# Patient Record
Sex: FEMALE | Race: WHITE | NOT HISPANIC OR LATINO | Employment: OTHER | URBAN - METROPOLITAN AREA
[De-identification: names, ages, dates, MRNs, and addresses within clinical notes are randomized per-mention and may not be internally consistent; named-entity substitution may affect disease eponyms.]

---

## 2023-12-27 ENCOUNTER — MEDICAL CORRESPONDENCE (OUTPATIENT)
Dept: SCHEDULING | Facility: CLINIC | Age: 68
End: 2023-12-27
Payer: MEDICARE

## 2024-01-15 ENCOUNTER — HOSPITAL ENCOUNTER (OUTPATIENT)
Dept: CARDIOLOGY | Facility: CLINIC | Age: 69
Discharge: HOME | End: 2024-01-15
Attending: INTERNAL MEDICINE | Admitting: INTERNAL MEDICINE
Payer: MEDICARE

## 2024-01-15 DIAGNOSIS — I25.10 CORONARY ATHEROSCLEROSIS OF NATIVE CORONARY ARTERY: ICD-10-CM

## 2024-01-15 DIAGNOSIS — R06.09 DYSPNEA ON EXERTION: ICD-10-CM

## 2024-01-15 PROCEDURE — 93350 STRESS TTE ONLY: CPT | Mod: 26 | Performed by: INTERNAL MEDICINE

## 2024-01-15 PROCEDURE — 93016 CV STRESS TEST SUPVJ ONLY: CPT | Performed by: INTERNAL MEDICINE

## 2024-01-15 PROCEDURE — 93325 DOPPLER ECHO COLOR FLOW MAPG: CPT | Mod: TC

## 2024-01-15 PROCEDURE — 93325 DOPPLER ECHO COLOR FLOW MAPG: CPT | Mod: 26 | Performed by: INTERNAL MEDICINE

## 2024-01-15 PROCEDURE — 93018 CV STRESS TEST I&R ONLY: CPT | Performed by: INTERNAL MEDICINE

## 2024-01-15 PROCEDURE — 93321 DOPPLER ECHO F-UP/LMTD STD: CPT | Mod: 26 | Performed by: INTERNAL MEDICINE

## 2024-01-15 RX ORDER — PANTOPRAZOLE SODIUM 40 MG/1
40 TABLET, DELAYED RELEASE ORAL DAILY
COMMUNITY

## 2024-01-15 RX ORDER — ROSUVASTATIN CALCIUM 5 MG/1
5 TABLET, COATED ORAL WEEKLY
COMMUNITY

## 2024-01-15 RX ORDER — AMLODIPINE BESYLATE 10 MG/1
10 TABLET ORAL DAILY
COMMUNITY

## 2024-01-15 RX ORDER — ASPIRIN 81 MG/1
81 TABLET ORAL DAILY
COMMUNITY

## 2024-01-15 RX ORDER — IRBESARTAN 300 MG/1
300 TABLET ORAL AT BEDTIME
COMMUNITY

## 2024-01-15 RX ORDER — DAPAGLIFLOZIN 10 MG/1
10 TABLET, FILM COATED ORAL DAILY
COMMUNITY

## 2024-01-25 ENCOUNTER — ANCILLARY PROCEDURE (OUTPATIENT)
Dept: BONE DENSITY | Facility: CLINIC | Age: 69
End: 2024-01-25
Attending: INTERNAL MEDICINE
Payer: MEDICARE

## 2024-01-25 DIAGNOSIS — E28.310 SYMPTOMATIC PREMATURE MENOPAUSE: ICD-10-CM

## 2024-01-25 PROCEDURE — 77080 DXA BONE DENSITY AXIAL: CPT | Mod: TC | Performed by: PHYSICIAN ASSISTANT

## 2024-03-09 ENCOUNTER — HEALTH MAINTENANCE LETTER (OUTPATIENT)
Age: 69
End: 2024-03-09

## 2024-06-17 ENCOUNTER — TRANSFERRED RECORDS (OUTPATIENT)
Dept: HEALTH INFORMATION MANAGEMENT | Facility: CLINIC | Age: 69
End: 2024-06-17

## 2024-06-17 LAB
ALT SERPL-CCNC: 10 U/L (ref 6–29)
AST SERPL-CCNC: 10 U/L (ref 10–35)
CREATININE (EXTERNAL): 1.3 MG/DL (ref 0.5–1.05)
GFR ESTIMATED (EXTERNAL): 45 ML/MIN/1.73M2
GLUCOSE (EXTERNAL): 83 MG/DL (ref 65–99)
HBA1C MFR BLD: 6.2 %
POTASSIUM (EXTERNAL): 4.6 MMOL/L (ref 3.5–5.3)

## 2024-07-05 ENCOUNTER — TRANSCRIBE ORDERS (OUTPATIENT)
Dept: OTHER | Age: 69
End: 2024-07-05

## 2024-07-05 DIAGNOSIS — M19.90 INFLAMMATORY ARTHROPATHY: Primary | ICD-10-CM

## 2024-08-06 ENCOUNTER — TELEPHONE (OUTPATIENT)
Dept: RHEUMATOLOGY | Facility: CLINIC | Age: 69
End: 2024-08-06
Payer: MEDICARE

## 2024-08-06 NOTE — TELEPHONE ENCOUNTER
[facility] Call [facility]    Phone Message    May a detailed message be left on voicemail: yes     Reason for Call: Other: Dr. Fran Prieto' office calling to request a return call to [facility] care.      Action Taken: Message routed to:  [facility] ([facility] rheum    Travel Screening: Not Applicable     Date of Service:

## 2024-08-08 NOTE — TELEPHONE ENCOUNTER
Please call patient and schedule for a new appointment per Dr. Singh. I have blocked Sept. 11th  30 minutes MELVIN 12-12:30 per Dr. Singh.     Thanks

## 2024-09-11 ENCOUNTER — HOSPITAL ENCOUNTER (OUTPATIENT)
Dept: GENERAL RADIOLOGY | Facility: HOSPITAL | Age: 69
Discharge: HOME | End: 2024-09-11
Attending: INTERNAL MEDICINE | Admitting: INTERNAL MEDICINE
Payer: MEDICARE

## 2024-09-11 ENCOUNTER — LAB (OUTPATIENT)
Dept: LAB | Facility: CLINIC | Age: 69
End: 2024-09-11
Payer: MEDICARE

## 2024-09-11 ENCOUNTER — OFFICE VISIT (OUTPATIENT)
Dept: RHEUMATOLOGY | Facility: CLINIC | Age: 69
End: 2024-09-11
Attending: INTERNAL MEDICINE
Payer: MEDICARE

## 2024-09-11 VITALS
HEART RATE: 73 BPM | WEIGHT: 158.3 LBS | OXYGEN SATURATION: 98 % | SYSTOLIC BLOOD PRESSURE: 121 MMHG | DIASTOLIC BLOOD PRESSURE: 72 MMHG

## 2024-09-11 DIAGNOSIS — M15.0 PRIMARY OSTEOARTHRITIS INVOLVING MULTIPLE JOINTS: ICD-10-CM

## 2024-09-11 DIAGNOSIS — R76.8 ANA POSITIVE: ICD-10-CM

## 2024-09-11 DIAGNOSIS — M25.50 POLYARTHRALGIA: Primary | ICD-10-CM

## 2024-09-11 DIAGNOSIS — M25.50 POLYARTHRALGIA: ICD-10-CM

## 2024-09-11 LAB
ALBUMIN SERPL BCG-MCNC: 4.6 G/DL (ref 3.5–5.2)
ALT SERPL W P-5'-P-CCNC: 15 U/L (ref 0–50)
CREAT SERPL-MCNC: 1.33 MG/DL (ref 0.51–0.95)
CRP SERPL-MCNC: 9.46 MG/L
EGFRCR SERPLBLD CKD-EPI 2021: 43 ML/MIN/1.73M2
ERYTHROCYTE [DISTWIDTH] IN BLOOD BY AUTOMATED COUNT: 13.4 % (ref 10–15)
ERYTHROCYTE [SEDIMENTATION RATE] IN BLOOD BY WESTERGREN METHOD: 19 MM/HR (ref 0–30)
HCT VFR BLD AUTO: 38.8 % (ref 35–47)
HGB BLD-MCNC: 12.1 G/DL (ref 11.7–15.7)
MCH RBC QN AUTO: 26.9 PG (ref 26.5–33)
MCHC RBC AUTO-ENTMCNC: 31.2 G/DL (ref 31.5–36.5)
MCV RBC AUTO: 86 FL (ref 78–100)
PLATELET # BLD AUTO: 354 10E3/UL (ref 150–450)
RBC # BLD AUTO: 4.49 10E6/UL (ref 3.8–5.2)
WBC # BLD AUTO: 9.3 10E3/UL (ref 4–11)

## 2024-09-11 PROCEDURE — 36415 COLL VENOUS BLD VENIPUNCTURE: CPT

## 2024-09-11 PROCEDURE — 86160 COMPLEMENT ANTIGEN: CPT

## 2024-09-11 PROCEDURE — 73030 X-RAY EXAM OF SHOULDER: CPT | Mod: 50

## 2024-09-11 PROCEDURE — 85652 RBC SED RATE AUTOMATED: CPT

## 2024-09-11 PROCEDURE — 73130 X-RAY EXAM OF HAND: CPT | Mod: 50

## 2024-09-11 PROCEDURE — 86140 C-REACTIVE PROTEIN: CPT

## 2024-09-11 PROCEDURE — 99204 OFFICE O/P NEW MOD 45 MIN: CPT | Performed by: INTERNAL MEDICINE

## 2024-09-11 PROCEDURE — G2211 COMPLEX E/M VISIT ADD ON: HCPCS | Performed by: INTERNAL MEDICINE

## 2024-09-11 PROCEDURE — 82565 ASSAY OF CREATININE: CPT

## 2024-09-11 PROCEDURE — 82040 ASSAY OF SERUM ALBUMIN: CPT

## 2024-09-11 PROCEDURE — 85027 COMPLETE CBC AUTOMATED: CPT

## 2024-09-11 PROCEDURE — 84460 ALANINE AMINO (ALT) (SGPT): CPT

## 2024-09-11 PROCEDURE — 86235 NUCLEAR ANTIGEN ANTIBODY: CPT

## 2024-09-11 RX ORDER — TIRZEPATIDE 2.5 MG/.5ML
INJECTION, SOLUTION SUBCUTANEOUS
COMMUNITY
Start: 2024-06-18

## 2024-09-11 RX ORDER — DULOXETIN HYDROCHLORIDE 20 MG/1
20 CAPSULE, DELAYED RELEASE ORAL DAILY
Qty: 30 CAPSULE | Refills: 2 | Status: SHIPPED | OUTPATIENT
Start: 2024-09-11 | End: 2024-09-17

## 2024-09-11 RX ORDER — TIRZEPATIDE 10 MG/.5ML
INJECTION, SOLUTION SUBCUTANEOUS
COMMUNITY
Start: 2024-09-08

## 2024-09-11 RX ORDER — TIRZEPATIDE 5 MG/.5ML
INJECTION, SOLUTION SUBCUTANEOUS
COMMUNITY
Start: 2024-06-18

## 2024-09-11 RX ORDER — TIRZEPATIDE 7.5 MG/.5ML
7.5 INJECTION, SOLUTION SUBCUTANEOUS WEEKLY
COMMUNITY
Start: 2024-08-13

## 2024-09-11 NOTE — PROGRESS NOTES
"  This document was created using a software with less than 100% fidelity, at times resulting in unintended, even erroneous syntax and grammar.  The reader is advised to keep this under consideration while reviewing, interpreting this note.      Rheumatology Consult Note      Brii Maynard     YOB: 1955 Age: 69 year old    Date of visit: 9/11/24    PCP: Fran Nogueira    Chief Complaint   Patient presents with:  Consult      Assessment and Plan     Brii was seen today for consult.    Diagnoses and all orders for this visit:    Polyarthralgia  -     XR Hand Bilateral G/E 3 Views; Future  -     XR Shoulder Bilateral 3 Views; Future  -     Erythrocyte sedimentation rate auto; Future  -     CRP inflammation; Future  -     CANDI antibody panel; Future  -     Complement C3; Future  -     Complement C4; Future  -     Albumin level; Future  -     ALT; Future  -     Creatinine; Future  -     CBC with Platelets; Future  -     DULoxetine (CYMBALTA) 20 MG capsule; Take 1 capsule (20 mg) by mouth daily.    Primary osteoarthritis involving multiple joints  -     DULoxetine (CYMBALTA) 20 MG capsule; Take 1 capsule (20 mg) by mouth daily.    QUYEN positive    Other orders  -     Adult Rheumatology  Referral           This patient with polyarthralgias has osteoarthritis.  This is discussed.  Literature on this is provided.  She is already on acetaminophen sustained-release.  She is status post bilateral TKAs.  One of the options for her to consider would be duloxetine literature on this is provided pros and cons discussed.  One of the key concerns in her context is if she has any additional component such as an inflammatory joint disease.  Her QUYEN is noted to be \"positive\" and negative dsDNA negative rheumatoid factor and negative anti-CCP antibody modest elevation of sed rate and CRP back in June.  This is outlined.  Further workup and evaluation are indicated x-rays to be done as noted, further labs " "and further interrogation of QUYEN as noted.  At 1 point in the past she has been told that she may have fibromyalgia she does have some signals that would require further evaluation once the current workup and her response to duloxetine becomes apparent.  X-rays of the shoulders and hands done today.  Personally reviewed the films.  In the hand x-rays she has advanced degenerative changes predominantly in the distal interphalangeal joints of both hands.  There is no chondrocalcinosis.  In the shoulders she has calcific deposits just above the humerus in conjunction with distribution of rotator cuff consistent with calcific tendinitis.  She has beginning of degenerative changes with acetabular osteophyte, caudally.  She plans to go out of state and of October early November as this year she is considering to be a \"snowbird\" back in March -2025.  The longitudinal plan of care for the diagnosis(es)/condition(s) as documented were addressed during this visit. Due to the added complexity in care, I will continue to support Brii in the subsequent management and with ongoing continuity of care.      HPI   Brii Maynard is a 69 year old female  is seen today for evaluation of polyarthralgias.  She reports that her joints have been bothersome going back several years.  She noted that there is hardly any joint.  That does not trouble her but the worst of her symptoms are in her hands, feet, knees.  She has had arthroplasty on both sides 2 than 3 years ago.  She was seen in orthopedics recently to revisit ongoing knee pain she reports having had x-rays again and was told that the hardware is in good place.  She noted that her symptoms tend to be worse with activity the more she is able to use her joints the worst she gets.  On most days the worst part of her joint pains are toward the evening after the dinner when she finds herself taking Tylenol fairly regularly.  She has difficult time holding onto things, opening jars " "squeezing.  She points to the DIPs and the PIPs as a source of her symptoms.  She wakes up in the morning unrefreshed having had 6 hours or so of sleep although woken up because of having to go to the bathroom.  Most days her pain level is around 3 out of 10 with activity the pain can rise.  She also describes painful shoulders.  This is longstanding.  There is no history of recent trauma or fall.  Sometimes this is woke her up from sleep laying on 1 side or the other.  Sometimes reaching is hard.  She has not had features suggestive of dactylitis.  Her younger sister who has Down syndrome is noted to have psoriasis as well as thyroid disease another sister might also have thyroid disease though she is not sure.  She had her workup done at her primary physician's office her sed rate and CRP were modestly elevated her QUYEN came back \"positive\", dsDNA were negative.  Rheumatoid factor anti-CCP antibody were negative.  She was asked to go ahead and start taking Tylenol on a regular basis that she had been avoiding to take on a regular basis for her concerns around kidney disease..  She reports no features suggestive of stomatitis, pleuritic symptoms, Raynaud's.  She has not had DVT PE.  There is no history of seizure disorder.  She has never been pregnant.  She had hysterectomy at age 38.  She reports no features suggestive of iritis.           Active Problem List   There is no problem list on file for this patient.    Past Medical History   No past medical history on file.  Past Surgical History   No past surgical history on file.  Allergy     Allergies   Allergen Reactions    Ampicillin     Sulfa Antibiotics      Current Medication List     Current Outpatient Medications   Medication Sig Dispense Refill    amLODIPine (NORVASC) 10 MG tablet Take 10 mg by mouth daily      aspirin 81 MG EC tablet Take 81 mg by mouth daily      dapagliflozin (FARXIGA) 10 MG TABS tablet Take 10 mg by mouth daily      ESTRADIOL None Entered "      FLONASE INHA 50 MCG/DOSE NA INHALE 2 SPRAYS IN EACH NOSTRIL ONCE DAILY 1 bottle 1    IBUPROFEN 800 MG OR TABS 1 TABLET 3 TIMES DAILY      irbesartan (AVAPRO) 300 MG tablet Take 300 mg by mouth at bedtime      pantoprazole (PROTONIX) 40 MG EC tablet Take 40 mg by mouth daily      ROBITUSSIN A-C  MG/5ML OR SYRP ONE TO TWO TEASPOONS EVERY 4 HOURS, AS NEEDED FOR COUGH 4 OZ 0    rosuvastatin (CRESTOR) 5 MG tablet Take 5 mg by mouth once a week Twice weekly      Semaglutide (OZEMPIC, 2 MG/DOSE, SC) Inject 2 mg Subcutaneous once a week      TESSALON PERLES 100 MG OR CAPS 2 CAPSULES 3 TIMES DAILY AS NEEDED 42 3     No current facility-administered medications for this visit.       No current facility-administered medications for this visit.        Family History   No family history on file.      Physical Exam     COMPREHENSIVE EXAMINATION:  Vitals:    09/11/24 1150   BP: 121/72   BP Location: Left arm   Pulse: 73   SpO2: 98%   Weight: 71.8 kg (158 lb 4.8 oz)     A well appearing alert oriented female. Vital data as noted above. Her eyes examined for inflammation/scleromalacia. ENT examined for oral mucositis, moisture, thrush, nasal deformity, external ear redness, deformity. Her neck is examined for suppleness and lymphadenopathy.  Cardiopulmonary examination without dyspnea at rest, use of accessory muscles of breathing, wheezing, edema, peripheral or central cyanosis.  Abdomen examined for softness, tenderness, obvious organomegaly.  Skin examined for heliotrope, malar area eruption, lupus pernio, periungual erythema, sclerodactyly, papules, erythema nodosum, purpura, nail pitting, onycholysis, and obvious psoriasis lesion. Neurological examination done for alertness, speech, facial symmetry,  tone and power in upper and lower extremities, and gait. The joint examination is performed for swelling, tenderness, warmth, erythema, and range of motion in the following joints: DIPs, PIPs, MCPs, wrists, first CMC's,  "elbows, shoulders, hips, knees, ankles, feet; spine for range of motion and paraspinal muscles for tenderness. The salient normal / abnormal findings are appended.  She has marked Heberden's and occasional Melissa's, tenderness throughout these PIPs and DIP joints.  She has marked Heberden's and occasional Melissa's, tenderness throughout these PIPs and DIP joints.  IP joints of the thumbs tender.  She has scattered tenderness in the metacarpophalangeal joints.She has impingement in both her shoulders tenderness in the trochanteric areas TKA bilaterally.  She does not have tenderness in the metatarsophalangeal joints.  There is no sclerodactyly periungual erythema there is no Maller area eruption she does not have stomatitis.  She is however tender across trapezius along the paraspinal region proximal upper extremities she is tender in the proximal and distal lower extremity soft tissues.  There is no dactylitis of digits or dactylitis of the toes.  She does not have ulceration fingertips or pitting.    Labs / Imaging (select studies)     No results found for: \"PPDINDURATIO\", \"PPDREDNESS\", \"TBGOLT\", \"RHF\", \"CCPABG\", \"URIC\", \"MD34505\", \"ME675167\", \"ANTIDNA\", \"ANTIDNAINT\", \"CARIA\", \"TF65824\", \"XW127820\", \"ENASM\", \"ENASCL\", \"JO1\", \"ENASSA\", \"ENASSB\", \"CENTA\", \"M9AXBLO\", \"Z7QNPXA\", \"KG2799\"   No results found for: \"CCPT\", \"RF8\", \"HGB\", \"BUN\", \"CREAT\", \"SED\", \"CRP\", \"AST\", \"CARIA\", \"ANCA\", \"QS8257\", \"ALBUMIN\", \"ALKPHOS\", \"ALT\", \"UL63399603\", \"CC90412622\", \"[id number]\", \"RHF\"       Immunization History     Immunization History   Administered Date(s) Administered    COVID-19 Bivalent 18+ (Moderna) 10/04/2022    COVID-19 Monovalent 12+ (Pfizer 2022) 04/26/2022    COVID-19 Monovalent 18+ (Moderna) 02/26/2021, 03/23/2021, 10/28/2021              "

## 2024-09-11 NOTE — PROGRESS NOTES
Rheumatology follow-up visit note     Brii is a 69 year old female presents today for follow-up.    Diagnoses and all orders for this visit:    Inflammatory arthropathy  -     Adult Rheumatology  Referral        ***    {:[id number]}    HPI    Brii Maynard is a 69 year old female is here for follow-up of         DETAILED EXAMINATION  09/11/24  :    There were no vitals filed for this visit.  Alert oriented. Head including the face is examined for malar rash, heliotropes, scarring, lupus pernio. Eyes examined for redness such as in episcleritis/scleritis, periorbital lesions.   Neck/ Face examined for parotid gland swelling, range of motion of neck.  Left upper and lower and right upper and lower extremities examined for tenderness, swelling, warmth of the appendicular joints, range of motion, edema, rash.  Some of the important findings included: she does not have evidence of synovitis in the palpable joints of the upper extremities.  No significant deformities of the digits.  *** Heberden nodes.  Range of motion of the shoulders *** show full abduction.  No JLT effusion or warmth of the knees.  she does not have dactylitis of the digits.     There are no problems to display for this patient.    No past surgical history on file.   No past medical history on file.  Allergies   Allergen Reactions    Ampicillin     Sulfa Antibiotics      Current Outpatient Medications   Medication Sig Dispense Refill    amLODIPine (NORVASC) 10 MG tablet Take 10 mg by mouth daily      aspirin 81 MG EC tablet Take 81 mg by mouth daily      dapagliflozin (FARXIGA) 10 MG TABS tablet Take 10 mg by mouth daily      ESTRADIOL None Entered      FLONASE INHA 50 MCG/DOSE NA INHALE 2 SPRAYS IN EACH NOSTRIL ONCE DAILY 1 bottle 1    IBUPROFEN 800 MG OR TABS 1 TABLET 3 TIMES DAILY      irbesartan (AVAPRO) 300 MG tablet Take 300 mg by mouth at bedtime      pantoprazole (PROTONIX) 40 MG EC tablet Take 40 mg by mouth daily       "ROBITUSSIN A-C  MG/5ML OR SYRP ONE TO TWO TEASPOONS EVERY 4 HOURS, AS NEEDED FOR COUGH 4 OZ 0    rosuvastatin (CRESTOR) 5 MG tablet Take 5 mg by mouth once a week Twice weekly      Semaglutide (OZEMPIC, 2 MG/DOSE, SC) Inject 2 mg Subcutaneous once a week      TESSALON PERLES 100 MG OR CAPS 2 CAPSULES 3 TIMES DAILY AS NEEDED 42 3     family history is not on file.  Social Connections: Not on file          No results found for: \"WBC\", \"RBC\", \"HGB\", \"HCT\", \"MCV\", \"MCH\", \"PLT\", \"LYMPH\", \"AST\", \"ALT\", \"ALBUMIN\", \"ALKPHOS\", \"CR\", \"GFRESTIMATED\", \"GFRESTBLACK\", \"CRPOC\", \"CCR\", \"UCRR\", \"SED\", \"CRP\", \"NTBNPI\"            "

## 2024-09-12 LAB
C3 SERPL-MCNC: 154 MG/DL (ref 81–157)
C4 SERPL-MCNC: 34 MG/DL (ref 13–39)
ENA SM IGG SER IA-ACNC: <0.7 U/ML
ENA SM IGG SER IA-ACNC: NEGATIVE
ENA SS-A AB SER IA-ACNC: <0.5 U/ML
ENA SS-A AB SER IA-ACNC: NEGATIVE
ENA SS-B IGG SER IA-ACNC: <0.6 U/ML
ENA SS-B IGG SER IA-ACNC: NEGATIVE
U1 SNRNP IGG SER IA-ACNC: <1.1 U/ML
U1 SNRNP IGG SER IA-ACNC: NEGATIVE

## 2024-09-17 ENCOUNTER — TELEPHONE (OUTPATIENT)
Dept: RHEUMATOLOGY | Facility: CLINIC | Age: 69
End: 2024-09-17
Payer: MEDICARE

## 2024-09-17 NOTE — TELEPHONE ENCOUNTER
[facility] Call [facility]    Phone Message    May a detailed message be left on voicemail: yes     Reason for Call: Other: Pt states she is not taking the  DULoxetine (CYMBALTA) 20 MG capsule. Pt was dizzy and nauseated after taking medication.     Action Taken: Message routed to:  Other: [facility]    Travel Screening: Not Applicable     Date of Service: 9/17

## 2024-10-29 ENCOUNTER — OFFICE VISIT (OUTPATIENT)
Dept: RHEUMATOLOGY | Facility: CLINIC | Age: 69
End: 2024-10-29
Payer: COMMERCIAL

## 2024-10-29 VITALS — SYSTOLIC BLOOD PRESSURE: 118 MMHG | HEART RATE: 76 BPM | DIASTOLIC BLOOD PRESSURE: 64 MMHG | WEIGHT: 153 LBS

## 2024-10-29 DIAGNOSIS — R76.8 ANA POSITIVE: ICD-10-CM

## 2024-10-29 DIAGNOSIS — M77.8 TENDINITIS OF SHOULDER, UNSPECIFIED LATERALITY: ICD-10-CM

## 2024-10-29 DIAGNOSIS — M25.50 POLYARTHRALGIA: Primary | ICD-10-CM

## 2024-10-29 DIAGNOSIS — M15.0 PRIMARY OSTEOARTHRITIS INVOLVING MULTIPLE JOINTS: ICD-10-CM

## 2024-10-29 PROCEDURE — G2211 COMPLEX E/M VISIT ADD ON: HCPCS | Performed by: INTERNAL MEDICINE

## 2024-10-29 PROCEDURE — 99214 OFFICE O/P EST MOD 30 MIN: CPT | Performed by: INTERNAL MEDICINE

## 2024-10-29 RX ORDER — DAPAGLIFLOZIN 10 MG/1
10 TABLET, FILM COATED ORAL DAILY
COMMUNITY

## 2024-10-29 RX ORDER — TIRZEPATIDE 12.5 MG/.5ML
12.5 INJECTION, SOLUTION SUBCUTANEOUS
COMMUNITY
Start: 2024-10-02

## 2024-10-29 RX ORDER — CEPHALEXIN 500 MG/1
CAPSULE ORAL
COMMUNITY
Start: 2024-06-13

## 2024-10-29 NOTE — PROGRESS NOTES
"      Rheumatology follow-up visit note     Brii is a 69 year old female presents today for follow-up.    Brii was seen today for follow up.    Diagnoses and all orders for this visit:    Polyarthralgia    Tendinitis of shoulder, unspecified laterality    Primary osteoarthritis involving multiple joints    QUYEN positive        this patient has polyarthralgia secondary to osteoarthritis, shoulder tendinopathy, calcific.  She was not able to tolerate duloxetine even at 20 mg because of side effects.  There is little evidence to suggest that she has active connective tissue disease which is reassuring as discussed. The worst of her symptoms are in the hands, neck.  The option of is spine clinic evaluation as outlined.  Another option for her might be to consider pain clinic per her primary physician.  We discussed nonpharmacologic measures for OA management.  Given she is leaving for [place] in the next few days I would like to defer the C-spine consultation.  She would like to come back here in 6 months.The longitudinal plan of care for the diagnosis(es)/condition(s) as documented were addressed during this visit. Due to the added complexity in care, I will continue to support Brii in the subsequent management and with ongoing continuity of care.      Follow up in 6 months.    HPI    Brii Maynard is a 69 year old female is here for follow-up of   Polyarthralgias, she  Has osteoarthritis.  This is discussed.  Literature on this is provided.  She is already on acetaminophen sustained-release.  She is status post bilateral TKAs.  One of the options for her to consider would be duloxetine literature on this is provided pros and cons discussed.  One of the key concerns in her context is if she has any additional component such as an inflammatory joint disease.  Her QUYEN is noted to be \"positive\" and negative dsDNA negative rheumatoid factor and negative anti-CCP antibody modest elevation of sed rate and CRP " "back in June.  This is outlined.  Further workup and evaluation are indicated x-rays to be done as noted, further labs and further interrogation of QUYEN as noted.  At 1 point in the past she has been told that she may have fibromyalgia she does have some signals that would require further evaluation once the current workup and her response to duloxetine becomes apparent.  X-rays of the shoulders and hands done today.  Personally reviewed the films.  In the hand x-rays she has advanced degenerative changes predominantly in the distal interphalangeal joints of both hands.  There is no chondrocalcinosis.  In the shoulders she has calcific deposits just above the humerus in conjunction with distribution of rotator cuff consistent with calcific tendinitis.  She has beginning of degenerative changes with acetabular osteophyte, caudally.  She plans to go out of state and of October early November as this year she is considering to be a \"snowbird\" back in March -2025.    DETAILED EXAMINATION  10/29/24  :    Vitals:    10/29/24 1207   BP: 118/64   BP Location: Right arm   Patient Position: Sitting   Cuff Size: Adult Regular   Pulse: 76   Weight: 69.4 kg (153 lb)     Alert oriented. Head including the face is examined for malar rash, heliotropes, scarring, lupus pernio. Eyes examined for redness such as in episcleritis/scleritis, periorbital lesions.   Neck/ Face examined for parotid gland swelling, range of motion of neck.  Left upper and lower and right upper and lower extremities examined for tenderness, swelling, warmth of the appendicular joints, range of motion, edema, rash.  Some of the important findings included: she does not have evidence of synovitis in the palpable joints of the upper extremities.  No significant deformities of the digits.  She has Heberden's, Melissa's bilaterally.  She has minimal impingement of the shoulders.  There is no sclerodactyly no rash on her face.  Stomatitis.     There are no active " problems to display for this patient.    No past surgical history on file.   No past medical history on file.  Allergies   Allergen Reactions    Ampicillin     Duloxetine Dizziness    Sulfa Antibiotics      Current Outpatient Medications   Medication Sig Dispense Refill    amLODIPine (NORVASC) 10 MG tablet Take 10 mg by mouth daily      aspirin 81 MG EC tablet Take 81 mg by mouth daily      dapagliflozin (FARXIGA) 10 MG TABS tablet Take 10 mg by mouth daily      irbesartan (AVAPRO) 300 MG tablet Take 300 mg by mouth at bedtime      MOUNJARO 10 MG/0.5ML pen  (Patient not taking: Reported on 9/11/2024)      MOUNJARO 2.5 MG/0.5ML pen  (Patient not taking: Reported on 9/11/2024)      MOUNJARO 5 MG/0.5ML pen INJECT 1 SYRINGE SUBCUTANEOUSLY ONCE A WEEK AFTER 4 DOSES OF 2.5 MG (Patient not taking: Reported on 9/11/2024)      MOUNJARO 7.5 MG/0.5ML pen Inject 7.5 mg subcutaneously once a week. (Patient not taking: Reported on 9/11/2024)      pantoprazole (PROTONIX) 40 MG EC tablet Take 40 mg by mouth daily      ROBITUSSIN A-C  MG/5ML OR SYRP ONE TO TWO TEASPOONS EVERY 4 HOURS, AS NEEDED FOR COUGH 4 OZ 0    rosuvastatin (CRESTOR) 5 MG tablet Take 5 mg by mouth once a week Twice weekly       family history is not on file.  Social Connections: Not on file          WBC Count   Date Value Ref Range Status   09/11/2024 9.3 4.0 - 11.0 10e3/uL Final     RBC Count   Date Value Ref Range Status   09/11/2024 4.49 3.80 - 5.20 10e6/uL Final     Hemoglobin   Date Value Ref Range Status   09/11/2024 12.1 11.7 - 15.7 g/dL Final     Hematocrit   Date Value Ref Range Status   09/11/2024 38.8 35.0 - 47.0 % Final     MCV   Date Value Ref Range Status   09/11/2024 86 78 - 100 fL Final     MCH   Date Value Ref Range Status   09/11/2024 26.9 26.5 - 33.0 pg Final     Platelet Count   Date Value Ref Range Status   09/11/2024 354 150 - 450 10e3/uL Final     ALT   Date Value Ref Range Status   09/11/2024 15 0 - 50 U/L Final     Albumin   Date  Value Ref Range Status   09/11/2024 4.6 3.5 - 5.2 g/dL Final     Creatinine   Date Value Ref Range Status   09/11/2024 1.33 (H) 0.51 - 0.95 mg/dL Final     GFR Estimate   Date Value Ref Range Status   09/11/2024 43 (L) >60 mL/min/1.73m2 Final     Comment:     eGFR calculated using 2021 CKD-EPI equation.     Erythrocyte Sedimentation Rate   Date Value Ref Range Status   09/11/2024 19 0 - 30 mm/hr Final

## 2025-01-07 ENCOUNTER — TRANSFERRED RECORDS (OUTPATIENT)
Dept: HEALTH INFORMATION MANAGEMENT | Facility: CLINIC | Age: 70
End: 2025-01-07

## 2025-01-30 ENCOUNTER — TRANSFERRED RECORDS (OUTPATIENT)
Dept: HEALTH INFORMATION MANAGEMENT | Facility: CLINIC | Age: 70
End: 2025-01-30

## 2025-03-16 ENCOUNTER — HEALTH MAINTENANCE LETTER (OUTPATIENT)
Age: 70
End: 2025-03-16

## 2025-03-17 ENCOUNTER — TRANSCRIBE ORDERS (OUTPATIENT)
Dept: OTHER | Age: 70
End: 2025-03-17

## 2025-03-17 DIAGNOSIS — G70.00 MYASTHENIA GRAVIS, ACETYLCHOLINE RECEPTOR ANTIBODY POSITIVE (H): Primary | ICD-10-CM

## 2025-03-19 NOTE — TELEPHONE ENCOUNTER
Action 3/19/25 MV 3.59pm   Action Taken Imaging request faxed to [facility]  Fax # [phone]  Tracking # [id number]    Records request faxed to [facility]  Fax # [phone]       RECORDS RECEIVED FROM: external   REASON FOR VISIT: Myasthenia gravis    PROVIDER: Dr. Martinez   DATE OF APPT: 6/30/25   NOTES (FOR ALL VISITS) STATUS DETAILS   OFFICE NOTE from referring provider In process Dr Jaspreet Padilla @ [facility], [place]:     OFFICE NOTE from other specialist Care Everywhere Dr Dayana Berrios @ [facility] [place]:  1/7/25    Dr Giana Singh @ [facility]:  10/29/24   DISCHARGE REPORT from the ER Care Everywhere [facility], [place]:  12/16/24-12/17/24   MEDICATION LIST Care Everywhere    IMAGING  (FOR ALL VISITS)     MRI (HEAD, NECK, SPINE) In process [facility], [place]:  MRI Brain 12/16/24  MRI Cervical Spine 12/16/24  MRA Head Neck 12/16/24

## 2025-04-30 ENCOUNTER — TRANSFERRED RECORDS (OUTPATIENT)
Dept: HEALTH INFORMATION MANAGEMENT | Facility: CLINIC | Age: 70
End: 2025-04-30

## 2025-06-25 ASSESSMENT — ACTIVITIES OF DAILY LIVING (ADL)
CHEWING: NORMAL
SWALLOWING: RARE CHOKING
IMPAIRMENT OF ABILITY TO RISE FROM CHAIR: NORMAL
TALKING: INTERMITTENT SLURRING OR NASAL SPEECH
IMPAIRMENT OF ABILITY TO BRUSH TEETH/COMB HAIR: EXTRA EFFORT, NO REST PERIODS NEEDED
BREATHING: SHORTNESS OF BREATH WITH EXERTION
MYC_MG-ADL-TOTAL_SCORE: 1
EYELID DROOP: DAILY, NOT CONSTANT

## 2025-06-29 NOTE — PROGRESS NOTES
Fran Nogueira MD (PCP)  [place], June 30, 2025     Dear Dr. Nogueira,    I had the pleasure to see Brii Maynard today at the [facility]. She was referred to [facility] care due to a recent diagnosis of AchR antibody positive MG made during her stay in [place], [place] last winter.     She is a 69 year old woman with type 2 diabetes, who developed right eyelid ptosis and binocular diplopia in 12/2024. MRI of brain and MRA were unremarkable. She also had a feeling of unsteady walking/imbalance at the onset of symptoms, as well as neck and generalized muscle weakness.     She was evaluated by a neurologist at [place], [place] (Dr Dayana Berrios) in 1/2025 and prescribed pyridostigmine 60 mg tid.  This was not very helpful.  She then saw Dr Jaspreet Brown at [place], [place] in 1/30/2025 (neuromuscular specialist) who gave her induction IVIG 2 g/kg over 5 days, and also increased her pyridostigmine to 90 mg tid and started her on prednisone 15 mg daily.  The IVIG helped instantly, the diplopia went away, and ptosis improved markedly.  However, she has not received any further IVIG since that time.  She is currently taking only 10 mg of prednisone daily.  This makes her very irritable/edgy and she cannot sleep at all.  It also makes her more anxious.  She does not think she can tolerate higher doses.  She has type 2 diabetes and, fortunately, recently her glycemic control is better.  Her last hemoglobin A1c was 6.2%.  2 years ago it was 7.5 to 7.6%.  She was not prescribed azathioprine or CellCept.    Currently she complains of excessive saliva, rhinorrhea, and lacrimation that make her very uncomfortable.  She has to use an ipratropium nasal spray.  Her MG symptoms are suboptimally controlled.  She has near constant ptosis, but no diplopia.  She has occasional difficulty swallowing with rare choking.  Her jaw will also slightly fatigue when she chews something hard.  Her  speech may get slurred towards the end of the day, to the point is difficult to understand her.  She also reports mild dyspnea on exertion, and significant arm fatigue.  When she tries to raise arms overhead such as washing her hair or blowdrying, she has to stop.  She will sometimes use her arms to get up from a chair as well.  She also complains of intermittent head drop.    She has not yet had an MRI or CT of the chest to rule out thymoma.       MG Activities of Daily Living (MG-ADL) profile score: 10      Grade 0 1 2 3   Talking Normal Intermittent slurring/nasal speech Constant slurring/nasal speech, can be understood Difficult to understand   Chewing Normal Fatigue with solid food Fatigue with soft food G tube   Swallowing Normal Rare choking Frequent choking necessitating diet changes G tube   Breathing Normal SOB with exertion SOB at rest Ventilator dependent   Ability to brush teeth/comb hair Normal Extra effort, no rest periods needed Rest periods needed Cannot do one of these   Ability to rise from chair Normal Mild impairment, uses arms sometimes Moderate impairment, always uses arms Severe impairment, requires assistance   Double vision None Present, not daily Daily, not constant Constant   Ptosis None Present, but not daily Daily, not constant Constant         LABS: AchR binding antibodies were initially positive in 12/16/2024 (1.08), subsequently borderline positive when rechecked in 1/2025 (0.47). Achr modulating and blocking antibodies negative. MuSK not tested    MRI/MRA of brain were unremarkable (12/17/2024).         No past medical history on file.    Current Outpatient Medications   Medication Sig Dispense Refill    amLODIPine (NORVASC) 10 MG tablet Take 10 mg by mouth daily      aspirin 81 MG EC tablet Take 81 mg by mouth daily      cephALEXin (KEFLEX) 500 MG capsule TAKE FOUR CAPSULES BY MOUTH ONE HOUR BEFORE APPOINTMENT (Patient not taking: Reported on 10/29/2024)      Coenzyme Q10 (CO Q 10  PO) Take by mouth.      dapagliflozin (FARXIGA) 10 MG TABS tablet Take 10 mg by mouth daily.      irbesartan (AVAPRO) 300 MG tablet Take 300 mg by mouth at bedtime      KRILL OIL PO Take by mouth.      MOUNJARO 12.5 MG/0.5ML SOAJ 12.5 mg every 7 days.      pantoprazole (PROTONIX) 40 MG EC tablet Take 40 mg by mouth daily      rosuvastatin (CRESTOR) 5 MG tablet Take 5 mg by mouth once a week Twice weekly      VITAMIN E PO Take by mouth.       No current facility-administered medications for this visit.       VITALS: /81   Pulse 83   Resp 16   SpO2 98%     EXAM: She is awake, alert, oriented x 3, and capable of providing a coherent history.  Cranial nerve examination shows bilateral eyelid ptosis, right more than left, that is clearly fatigable with sustained upward gaze.  There is partial pupil coverage on the right.  There is mild weakness of the right orbicularis oculi, none on the left.  There is no diplopia in any cardinal gaze position.  Ductions and versions of the eyes were normal.  She has mild weakness of cheek puff, but good lip seal.  Strength of jaw and tongue appears normal.  Uvula and palate elevated midline.  There is no dysphonia or dysarthria.  She has mild weakness of neck flexion (MRC 4).  Neck extension is normal.  Strength in deltoid is 5/5 and so are biceps, wrist extensors, and handgrip.  Triceps strength is mildly weak, 4+ bilaterally.  Hip flexion, knee extension, knee flexion are bilaterally 5.    IMPRESSION: In summary, Brii has newly diagnosed (12/2024) generalized acetylcholine receptor antibody positive myasthenia gravis.  She does need a CT scan of the chest to rule out thymoma.  I would not give contrast because she has CKD with diabetes and creatinine 1.5.  I ordered the CT.    Regarding treatment of her myasthenia, this needs to be escalated.  She cannot tolerate prednisone doses more than 10 mg a day and her myasthenia is clearly suboptimally controlled right now.  We  discussed oral immunosuppressant drugs, such as azathioprine or CellCept.  Those may work, but their onset of action is delayed by 3 to 6 months.  I do not think we can afford this delay, because the patient's symptoms are very distressing and affect her daily quality of life.  Along those lines, I would propose treatment with subcutaneous rozanolixizumab (Rystiggo).  I explained to the patient the mechanism of action of this drug, and the general good tolerability with minimal side effects.  She is interested in starting this.    I explained to her why I prefer this option over maintenance IVIG.  Maintenance IVIG may also work for her, but it is associated with a slightly increased risk of thrombotic events and worsening of her kidney function.  This risk is elevated in patients over age 65 with diabetes and baseline CKD.    I will see the patient follow-up in 3 months, sooner if needed.  If she is markedly better after initiation of Rystiggo, we should taper her prednisone dose slowly until we stop it.    I would also prescribe her glycopyrrolate 1 mg 3 times daily, to be taken 20 to 30 minutes prior to each pyridostigmine dose.  She does have significant cholinergic side effects of this drug and those will be likely ameliorated by this approach.    All of the questions of the patient and her niece were answered to the best I could.    Sincerely,      Mani Martinez MD, FAAN    Billing MDM level 4 (moderate) based on 1) Problems assessed: One chronic disorder with progression, exacerbation, or side effects of treatment (MG) and 2)Risk: prescription drug management, see above.    The longitudinal plan of care for the diagnosis(es)/condition(s) as documented were addressed during this visit. Due to the added complexity in care, I will continue to support Brii in the subsequent management and with ongoing continuity of care.

## 2025-06-30 ENCOUNTER — OFFICE VISIT (OUTPATIENT)
Dept: NEUROLOGY | Facility: CLINIC | Age: 70
End: 2025-06-30
Payer: MEDICARE

## 2025-06-30 ENCOUNTER — PRE VISIT (OUTPATIENT)
Dept: NEUROLOGY | Facility: CLINIC | Age: 70
End: 2025-06-30

## 2025-06-30 ENCOUNTER — HOSPITAL ENCOUNTER (OUTPATIENT)
Dept: CT IMAGING | Facility: CLINIC | Age: 70
Discharge: HOME | End: 2025-06-30
Attending: PSYCHIATRY & NEUROLOGY | Admitting: PSYCHIATRY & NEUROLOGY
Payer: MEDICARE

## 2025-06-30 VITALS
HEART RATE: 83 BPM | OXYGEN SATURATION: 98 % | RESPIRATION RATE: 16 BRPM | SYSTOLIC BLOOD PRESSURE: 137 MMHG | DIASTOLIC BLOOD PRESSURE: 81 MMHG

## 2025-06-30 DIAGNOSIS — G70.00 MYASTHENIA GRAVIS, ACETYLCHOLINE RECEPTOR ANTIBODY POSITIVE (H): ICD-10-CM

## 2025-06-30 DIAGNOSIS — K11.7 SIALORRHEA: Primary | ICD-10-CM

## 2025-06-30 PROCEDURE — 99204 OFFICE O/P NEW MOD 45 MIN: CPT | Performed by: PSYCHIATRY & NEUROLOGY

## 2025-06-30 PROCEDURE — 3079F DIAST BP 80-89 MM HG: CPT | Performed by: PSYCHIATRY & NEUROLOGY

## 2025-06-30 PROCEDURE — 1126F AMNT PAIN NOTED NONE PRSNT: CPT | Performed by: PSYCHIATRY & NEUROLOGY

## 2025-06-30 PROCEDURE — G2211 COMPLEX E/M VISIT ADD ON: HCPCS | Performed by: PSYCHIATRY & NEUROLOGY

## 2025-06-30 PROCEDURE — 71250 CT THORAX DX C-: CPT | Mod: 26 | Performed by: RADIOLOGY

## 2025-06-30 PROCEDURE — 71250 CT THORAX DX C-: CPT

## 2025-06-30 PROCEDURE — 3075F SYST BP GE 130 - 139MM HG: CPT | Performed by: PSYCHIATRY & NEUROLOGY

## 2025-06-30 RX ORDER — GLYCOPYRROLATE 1 MG/1
1 TABLET ORAL 3 TIMES DAILY
Qty: 270 TABLET | Refills: 1 | Status: SHIPPED | OUTPATIENT
Start: 2025-06-30

## 2025-06-30 RX ORDER — PYRIDOSTIGMINE BROMIDE 60 MG/1
60 TABLET ORAL 3 TIMES DAILY
COMMUNITY

## 2025-06-30 RX ORDER — PREDNISONE 10 MG/1
1 TABLET ORAL DAILY
COMMUNITY
Start: 2025-01-07

## 2025-06-30 ASSESSMENT — PATIENT HEALTH QUESTIONNAIRE - PHQ9: SUM OF ALL RESPONSES TO PHQ QUESTIONS 1-9: 10

## 2025-06-30 ASSESSMENT — PAIN SCALES - GENERAL: PAINLEVEL_OUTOF10: NO PAIN (0)

## 2025-06-30 NOTE — LETTER
6/30/2025       RE: Brii Maynard  [address] [place] [address]     Dear Colleague,    Thank you for referring your patient, Brii Maynard, to the [facility] [place] at [facility]. Please see a copy of my visit note below.    Fran Nogueira MD (PCP)  [place], June 30, 2025     Dear Dr. Nogueira,    I had the pleasure to see Brii Maynard today at the [facility]Neuromuscular [facility]. She was referred to establish care due to a recent diagnosis of AchR antibody positive MG made during her stay in [place], [place] last winter.     She is a 69 year old woman with type 2 diabetes, who developed right eyelid ptosis and binocular diplopia in 12/2024. MRI of brain and MRA were unremarkable. She also had a feeling of unsteady walking/imbalance at the onset of symptoms, as well as neck and generalized muscle weakness.     She was evaluated by a neurologist at [place], [place] (Dr Dayana Berrios) in 1/2025 and prescribed pyridostigmine 60 mg tid.  This was not very helpful.  She then saw Dr Jaspreet Brown at [place], [place] in 1/30/2025 (neuromuscular specialist) who gave her induction IVIG 2 g/kg over 5 days, and also increased her pyridostigmine to 90 mg tid and started her on prednisone 15 mg daily.  The IVIG helped instantly, the diplopia went away, and ptosis improved markedly.  However, she has not received any further IVIG since that time.  She is currently taking only 10 mg of prednisone daily.  This makes her very irritable/edgy and she cannot sleep at all.  It also makes her more anxious.  She does not think she can tolerate higher doses.  She has type 2 diabetes and, fortunately, recently her glycemic control is better.  Her last hemoglobin A1c was 6.2%.  2 years ago it was 7.5 to 7.6%.  She was not prescribed azathioprine or CellCept.    Currently she complains of excessive saliva, rhinorrhea, and  lacrimation that make her very uncomfortable.  She has to use an ipratropium nasal spray.  Her MG symptoms are suboptimally controlled.  She has near constant ptosis, but no diplopia.  She has occasional difficulty swallowing with rare choking.  Her jaw will also slightly fatigue when she chews something hard.  Her speech may get slurred towards the end of the day, to the point is difficult to understand her.  She also reports mild dyspnea on exertion, and significant arm fatigue.  When she tries to raise arms overhead such as washing her hair or blowdrying, she has to stop.  She will sometimes use her arms to get up from a chair as well.  She also complains of intermittent head drop.    She has not yet had an MRI or CT of the chest to rule out thymoma.       MG Activities of Daily Living (MG-ADL) profile score: 10      Grade 0 1 2 3   Talking Normal Intermittent slurring/nasal speech Constant slurring/nasal speech, can be understood Difficult to understand   Chewing Normal Fatigue with solid food Fatigue with soft food G tube   Swallowing Normal Rare choking Frequent choking necessitating diet changes G tube   Breathing Normal SOB with exertion SOB at rest Ventilator dependent   Ability to brush teeth/comb hair Normal Extra effort, no rest periods needed Rest periods needed Cannot do one of these   Ability to rise from chair Normal Mild impairment, uses arms sometimes Moderate impairment, always uses arms Severe impairment, requires assistance   Double vision None Present, not daily Daily, not constant Constant   Ptosis None Present, but not daily Daily, not constant Constant         LABS: AchR binding antibodies were initially positive in 12/16/2024 (1.08), subsequently borderline positive when rechecked in 1/2025 (0.47). Achr modulating and blocking antibodies negative. MuSK not tested    MRI/MRA of brain were unremarkable (12/17/2024).         No past medical history on file.    Current Outpatient Medications    Medication Sig Dispense Refill     amLODIPine (NORVASC) 10 MG tablet Take 10 mg by mouth daily       aspirin 81 MG EC tablet Take 81 mg by mouth daily       cephALEXin (KEFLEX) 500 MG capsule TAKE FOUR CAPSULES BY MOUTH ONE HOUR BEFORE APPOINTMENT (Patient not taking: Reported on 10/29/2024)       Coenzyme Q10 (CO Q 10 PO) Take by mouth.       dapagliflozin (FARXIGA) 10 MG TABS tablet Take 10 mg by mouth daily.       irbesartan (AVAPRO) 300 MG tablet Take 300 mg by mouth at bedtime       KRILL OIL PO Take by mouth.       MOUNJARO 12.5 MG/0.5ML SOAJ 12.5 mg every 7 days.       pantoprazole (PROTONIX) 40 MG EC tablet Take 40 mg by mouth daily       rosuvastatin (CRESTOR) 5 MG tablet Take 5 mg by mouth once a week Twice weekly       VITAMIN E PO Take by mouth.       No current facility-administered medications for this visit.       VITALS: /81   Pulse 83   Resp 16   SpO2 98%     EXAM: She is awake, alert, oriented x 3, and capable of providing a coherent history.  Cranial nerve examination shows bilateral eyelid ptosis, right more than left, that is clearly fatigable with sustained upward gaze.  There is partial pupil coverage on the right.  There is mild weakness of the right orbicularis oculi, none on the left.  There is no diplopia in any cardinal gaze position.  Ductions and versions of the eyes were normal.  She has mild weakness of cheek puff, but good lip seal.  Strength of jaw and tongue appears normal.  Uvula and palate elevated midline.  There is no dysphonia or dysarthria.  She has mild weakness of neck flexion (MRC 4).  Neck extension is normal.  Strength in deltoid is 5/5 and so are biceps, wrist extensors, and handgrip.  Triceps strength is mildly weak, 4+ bilaterally.  Hip flexion, knee extension, knee flexion are bilaterally 5.    IMPRESSION: In summary, Brii has newly diagnosed (12/2024) generalized acetylcholine receptor antibody positive myasthenia gravis.  She does need a CT scan of the  chest to rule out thymoma.  I would not give contrast because she has CKD with diabetes and creatinine 1.5.  I ordered the CT.    Regarding treatment of her myasthenia, this needs to be escalated.  She cannot tolerate prednisone doses more than 10 mg a day and her myasthenia is clearly suboptimally controlled right now.  We discussed oral immunosuppressant drugs, such as azathioprine or CellCept.  Those may work, but their onset of action is delayed by 3 to 6 months.  I do not think we can afford this delay, because the patient's symptoms are very distressing and affect her daily quality of life.  Along those lines, I would propose treatment with subcutaneous rozanolixizumab (Rystiggo).  I explained to the patient the mechanism of action of this drug, and the general good tolerability with minimal side effects.  She is interested in starting this.    I explained to her why I prefer this option over maintenance IVIG.  Maintenance IVIG may also work for her, but it is associated with a slightly increased risk of thrombotic events and worsening of her kidney function.  This risk is elevated in patients over age 65 with diabetes and baseline CKD.    I will see the patient follow-up in 3 months, sooner if needed.  If she is markedly better after initiation of Rystiggo, we should taper her prednisone dose slowly until we stop it.    I would also prescribe her glycopyrrolate 1 mg 3 times daily, to be taken 20 to 30 minutes prior to each pyridostigmine dose.  She does have significant cholinergic side effects of this drug and those will be likely ameliorated by this approach.    All of the questions of the patient and her niece were answered to the best I could.    Sincerely,      Mani Martinez MD, FAAN    Billing MDM level 4 (moderate) based on 1) Problems assessed: One chronic disorder with progression, exacerbation, or side effects of treatment (MG) and 2)Risk: prescription drug management, see above.    The  longitudinal plan of care for the diagnosis(es)/condition(s) as documented were addressed during this visit. Due to the added complexity in care, I will continue to support Brii in the subsequent management and with ongoing continuity of care.      Again, thank you for allowing me to participate in the care of your patient.      Sincerely,    Mani Martinez MD

## 2025-06-30 NOTE — PATIENT INSTRUCTIONS
I would recommend trying subcutaneous infusions of Rystiggo, a new drug approved for myasthenia that works quite quickly, and is tolerated very well.  Those injections are done weekly for 6 weeks, and then you will take a break of at least 4 weeks between cycles.    Down the way, we should discuss prescription of an oral immunosuppressant drug like CellCept to accomplish better long-term control of your myasthenia, but I will not do this right now.    The excessive saliva and runny nose/tearing, are likely side effects of the pyridostigmine.  What may help a lot is taking a drug called glycopyrrolate 30 minutes before each pyridostigmine dose of the day.  This can reduce all the excessive saliva and runny nose and you might not even need to take the ipratropium nasal spray.    For now, please continue the prednisone at 10 mg daily.  If you are getting much better with the infusions, we will cut down the prednisone dose gradually.    Follow-up in 3 months  
Statement Selected

## 2025-06-30 NOTE — NURSING NOTE
Chief Complaint   Patient presents with    Consult      Jimmy Plummer    Depression Response    Patient completed the PHQ-9 assessment for depression and scored >9? Yes  Question 9 on the PHQ-9 was positive for suicidality? No  Does patient have current mental health provider? No    Is this a virtual visit? No    I personally notified the following: visit provider

## 2025-07-01 ENCOUNTER — RESULTS FOLLOW-UP (OUTPATIENT)
Dept: NEUROLOGY | Facility: CLINIC | Age: 70
End: 2025-07-01

## 2025-07-01 LAB — RADIOLOGIST FLAGS: NORMAL

## 2025-07-07 ENCOUNTER — VIRTUAL VISIT (OUTPATIENT)
Dept: PHARMACY | Facility: CLINIC | Age: 70
End: 2025-07-07
Attending: PSYCHIATRY & NEUROLOGY
Payer: MEDICARE

## 2025-07-07 ENCOUNTER — TELEPHONE (OUTPATIENT)
Dept: PHARMACY | Facility: CLINIC | Age: 70
End: 2025-07-07
Payer: MEDICARE

## 2025-07-07 DIAGNOSIS — I10 BENIGN ESSENTIAL HYPERTENSION: ICD-10-CM

## 2025-07-07 DIAGNOSIS — E11.65 TYPE 2 DIABETES MELLITUS WITH HYPERGLYCEMIA (H): ICD-10-CM

## 2025-07-07 DIAGNOSIS — G70.00 MYASTHENIA GRAVIS, ACETYLCHOLINE RECEPTOR ANTIBODY POSITIVE (H): Primary | ICD-10-CM

## 2025-07-07 DIAGNOSIS — Z78.9 TAKES DIETARY SUPPLEMENTS: ICD-10-CM

## 2025-07-07 DIAGNOSIS — K21.9 GASTROESOPHAGEAL REFLUX DISEASE WITHOUT ESOPHAGITIS: ICD-10-CM

## 2025-07-07 DIAGNOSIS — E78.2 MIXED HYPERLIPIDEMIA: ICD-10-CM

## 2025-07-07 RX ORDER — DIPHENHYDRAMINE HYDROCHLORIDE 50 MG/ML
50 INJECTION, SOLUTION INTRAMUSCULAR; INTRAVENOUS
Start: 2025-07-14

## 2025-07-07 RX ORDER — DIPHENHYDRAMINE HYDROCHLORIDE 50 MG/ML
25 INJECTION, SOLUTION INTRAMUSCULAR; INTRAVENOUS
Start: 2025-07-14

## 2025-07-07 RX ORDER — LACTOBACILLUS RHAMNOSUS GG 10B CELL
1 CAPSULE ORAL 2 TIMES DAILY
COMMUNITY

## 2025-07-07 RX ORDER — FAMOTIDINE 20 MG
4000 TABLET ORAL DAILY
COMMUNITY

## 2025-07-07 RX ORDER — MEPERIDINE HYDROCHLORIDE 25 MG/ML
25 INJECTION INTRAMUSCULAR; INTRAVENOUS; SUBCUTANEOUS
OUTPATIENT
Start: 2025-07-14

## 2025-07-07 RX ORDER — EPINEPHRINE 1 MG/ML
0.3 INJECTION, SOLUTION, CONCENTRATE INTRAVENOUS EVERY 5 MIN PRN
OUTPATIENT
Start: 2025-07-14

## 2025-07-07 RX ORDER — ALBUTEROL SULFATE 0.83 MG/ML
2.5 SOLUTION RESPIRATORY (INHALATION)
OUTPATIENT
Start: 2025-07-14

## 2025-07-07 RX ORDER — METHYLPREDNISOLONE SODIUM SUCCINATE 40 MG/ML
40 INJECTION INTRAMUSCULAR; INTRAVENOUS
Start: 2025-07-14

## 2025-07-07 RX ORDER — ALBUTEROL SULFATE 90 UG/1
1-2 INHALANT RESPIRATORY (INHALATION)
Start: 2025-07-14

## 2025-07-07 NOTE — PROGRESS NOTES
Medication Therapy Management (MTM) Encounter    ASSESSMENT:                            Medication Adherence/Access: No issues identified.    MG  Patient may benefit from starting Rystiggo as recommended by neurologist as patient can't tolerate higher doses of prednisone, would like to avoid IVIG at the moment due to CKD, and her symptoms are not controlled with pyridostigmine. Education provided to the patient on medication dosing and potential side effects as well as the infusion process. Patient would prefer to infuse at [facility]     Diabetes   Recent A1c at goal of less than 7%. Patient is tolerating medication regimen. Continue with current medications.     Hypertension   Blood pressure slightly above goal of at last office visit but was previously at goal. If readings continue to be above goal then would recommend a follow-up with primary care provider to further discuss.      Hyperlipidemia   Patient recently restarted rosuvastatin and is tolerating. Recommend to follow-up with primary care provider in 4-12 weeks to repeat lipid panel.      GERD    Stable, continue with current medication regimen.     Supplements   Stable, continue with current supplement regimen.     PLAN:                            Today we discussed starting Rystiggo subcutaneous infusion once weekly for 6 weeks. The dose will be 560mg (4ml) per injection  The injection takes about 15 minutes and it will be in your abdomen  Possible side effects: hypersensitivity reaction, pain in injection site, diarrhea, nausea, increased risk of infection (upper respiratory and UTI), headache, fever, and pain in joints  After the 6 weeks there will be a break: please keep track of your symptoms. You are eligible to start another treatment 63 days from the start of the previous treatment cycle   Please call the [facility] ([phone]) to schedule your infusion appointment. If you are eligible for [facility] ([facility]),  they will contact you to transition.      Follow-up: We can schedule a follow-up after you start Rystiggo     SUBJECTIVE/OBJECTIVE:                          Brii Maynard is a 69 year old female seen for an initial visit. She was referred to me from Dr. Martinez.      Reason for visit: Rystiggo initiation and coordination.    Allergies/ADRs: Reviewed in chart  Past Medical History: Reviewed in chart  Tobacco: She reports that she is a non-smoker but has been exposed to tobacco smoke. She does not have any smokeless tobacco history on file.  Alcohol: occasionally     Medication Adherence/Access: no issues reported.    MG  -glycopyrrolate 1mg three times daily - this was recently started and seems to be helping with the excess saliva   -pyridostigmine 60mg three times daily   -prednisone 10mg daily (doesn't tolerate higher doses)     Patient will be on vacation from 7/15 to 7/25     MG-ADL score when she saw Dr. Martinez was 10    Previous treatments  -IVIG   -higher doses of prednisone (irritable, edgy, can't sleep)  -higher doses of pyridostigmine     Diabetes   -aspirin 81mg daily   -Farxiga 10mg daily   -Mounjaro 15mg once weekly  Patient is not experiencing side effects.  Current diabetes symptoms: none   Blood sugar monitoring: occasionally; Ranges: (patient reported) Fasting- 73mg/dl - patient reports she felt fine this morning       Hypertension   -amlodipine 10mg daily  -irbesartan 300mg daily   Patient reports no current medication side effects  Patient does not self-monitor blood pressure.    BP Readings from Last 3 Encounters:   06/30/25 137/81   10/29/24 118/64   09/11/24 121/72            Hyperlipidemia   -rosuvastatin 5mg three times weekly   Patient reports no significant myalgias or other side effects.  Patient was not taking this for the past few months and has now been instructed to restart        GERD    Pantoprazole 20 mg once daily   Patient reports no current symptoms.   Patient feels that  current regimen is effective.       Supplements   -Vitamin D 4000 units daily   -Krill oil daily - unsure of mg right now   -coenzyme Q10 100mg daily   -probiotic daily   No reported issues at this time.        Today's Vitals: There were no vitals taken for this visit.  ----------------      I spent 25 minutes with this patient today. I offer these suggestions for consideration by Dr. Martinez.     A summary of these recommendations was sent via [facility].    Josefa Salinas, PharmD, BCACP  Medication Therapy Management Pharmacist   [facility]      Telemedicine Visit Details  The patient's medications can be safely assessed via a telemedicine encounter.  Type of service:  Telephone visit  Originating Location (pt. Location): [facility]   Distant Location (provider location):  Off-site  Start Time: 1:00 PM  End Time: 1:25 PM     Medication Therapy Recommendations  No medication therapy recommendations to display

## 2025-07-07 NOTE — PATIENT INSTRUCTIONS
"Recommendations from today's MTM visit:                                                    MTM (medication therapy management) is a service provided by a clinical pharmacist designed to help you get the most of out of your medicines.      Today we discussed starting Rystiggo subcutaneous infusion once weekly for 6 weeks. The dose will be 560mg (4ml) per injection  The injection takes about 15 minutes and it will be in your abdomen  Possible side effects: hypersensitivity reaction, pain in injection site, diarrhea, nausea, increased risk of infection (upper respiratory and UTI), headache, fever, and pain in joints  After the 6 weeks there will be a break: please keep track of your symptoms. You are eligible to start another treatment 63 days from the start of the previous treatment cycle   Please call the [facility] ([phone]) to schedule your infusion appointment. If you are eligible for [facility] ([facility]), they will contact you to transition.      Follow-up: We can schedule a follow-up after you start Rystiggo     It was great speaking with you today.  I value your experience and would be very thankful for your time in providing feedback in our clinic survey. In the next few days, you may receive an email or text message from [employer] with a link to a survey related to your  clinical pharmacist.\"     To schedule another MTM appointment, please call the clinic directly or you may call the MTM scheduling line at [phone] or toll-free at [phone].     My Clinical Pharmacist's contact information:                                                      Please feel free to contact me with any questions or concerns you have.      Josefa Salinas, PharmD, BCACP  Medication Therapy Management Pharmacist   [facility]     "

## 2025-07-07 NOTE — TELEPHONE ENCOUNTER
Patient to start Rystiggo with plans of infusing at [facility].  Pending therapy plan to Dr. Martinez to review and sing.     Josefa Salinas, PharmD, BCACP  Medication Therapy Management Pharmacist   [facility]

## 2025-07-14 ENCOUNTER — HOSPITAL ENCOUNTER (OUTPATIENT)
Dept: PET IMAGING | Facility: HOSPITAL | Age: 70
Discharge: HOME | End: 2025-07-14
Attending: INTERNAL MEDICINE | Admitting: INTERNAL MEDICINE
Payer: MEDICARE

## 2025-07-14 DIAGNOSIS — R91.8 PULMONARY MASS: ICD-10-CM

## 2025-07-14 DIAGNOSIS — G70.00 MYASTHENIA GRAVIS (H): ICD-10-CM

## 2025-07-14 LAB — GLUCOSE BLDC GLUCOMTR-MCNC: 95 MG/DL (ref 70–99)

## 2025-07-14 PROCEDURE — A9552 F18 FDG: HCPCS | Performed by: INTERNAL MEDICINE

## 2025-07-14 PROCEDURE — 343N000001 HC RX 343 MED OP 636: Performed by: INTERNAL MEDICINE

## 2025-07-14 PROCEDURE — 82962 GLUCOSE BLOOD TEST: CPT

## 2025-07-14 PROCEDURE — 78816 PET IMAGE W/CT FULL BODY: CPT | Mod: PI

## 2025-07-14 RX ORDER — FLUDEOXYGLUCOSE F 18 200 MCI/ML
7-18 INJECTION, SOLUTION INTRAVENOUS ONCE
Status: COMPLETED | OUTPATIENT
Start: 2025-07-14 | End: 2025-07-14

## 2025-07-14 RX ADMIN — FLUDEOXYGLUCOSE F 18 10.17 MILLICURIE: 200 INJECTION, SOLUTION INTRAVENOUS at 13:56

## 2025-07-20 ENCOUNTER — HEALTH MAINTENANCE LETTER (OUTPATIENT)
Age: 70
End: 2025-07-20

## 2025-07-22 ENCOUNTER — MYC MEDICAL ADVICE (OUTPATIENT)
Dept: INTERVENTIONAL RADIOLOGY/VASCULAR | Facility: CLINIC | Age: 70
End: 2025-07-22
Payer: MEDICARE

## 2025-07-28 ENCOUNTER — ENROLLMENT (OUTPATIENT)
Dept: HOME HEALTH SERVICES | Facility: HOME HEALTH | Age: 70
End: 2025-07-28
Payer: MEDICARE

## 2025-08-01 ENCOUNTER — TRANSFERRED RECORDS (OUTPATIENT)
Dept: HEALTH INFORMATION MANAGEMENT | Facility: CLINIC | Age: 70
End: 2025-08-01
Payer: MEDICARE

## 2025-08-01 LAB
CREATININE (EXTERNAL): 1.43 MG/DL (ref 0.6–1)
GFR ESTIMATED (EXTERNAL): 39 ML/MIN/1.73M2
GLUCOSE (EXTERNAL): 175 MG/DL (ref 65–99)
POTASSIUM (EXTERNAL): 4.7 MMOL/L (ref 3.5–5.3)

## 2025-08-04 ENCOUNTER — TRANSFERRED RECORDS (OUTPATIENT)
Dept: HEALTH INFORMATION MANAGEMENT | Facility: CLINIC | Age: 70
End: 2025-08-04
Payer: MEDICARE

## 2025-08-05 ENCOUNTER — HOSPITAL ENCOUNTER (OUTPATIENT)
Dept: CT IMAGING | Facility: HOSPITAL | Age: 70
Discharge: HOME | End: 2025-08-05
Attending: INTERNAL MEDICINE
Payer: MEDICARE

## 2025-08-05 ENCOUNTER — HOSPITAL ENCOUNTER (OUTPATIENT)
Dept: RADIOLOGY | Facility: HOSPITAL | Age: 70
Discharge: HOME | End: 2025-08-05
Attending: STUDENT IN AN ORGANIZED HEALTH CARE EDUCATION/TRAINING PROGRAM
Payer: MEDICARE

## 2025-08-05 VITALS
OXYGEN SATURATION: 98 % | SYSTOLIC BLOOD PRESSURE: 135 MMHG | HEART RATE: 74 BPM | RESPIRATION RATE: 19 BRPM | DIASTOLIC BLOOD PRESSURE: 75 MMHG

## 2025-08-05 VITALS
OXYGEN SATURATION: 99 % | RESPIRATION RATE: 18 BRPM | DIASTOLIC BLOOD PRESSURE: 74 MMHG | SYSTOLIC BLOOD PRESSURE: 130 MMHG | HEART RATE: 75 BPM

## 2025-08-05 DIAGNOSIS — C34.11 MALIGNANT NEOPLASM OF UPPER LOBE OF RIGHT LUNG (H): ICD-10-CM

## 2025-08-05 DIAGNOSIS — R91.1 RIGHT UPPER LOBE PULMONARY NODULE: Primary | ICD-10-CM

## 2025-08-05 DIAGNOSIS — Z01.818 PRE-OP EXAM: ICD-10-CM

## 2025-08-05 LAB
APTT PPP: 22 SECONDS (ref 22–38)
GLUCOSE BLDC GLUCOMTR-MCNC: 164 MG/DL (ref 70–99)
HGB BLD-MCNC: 10.7 G/DL (ref 11.7–15.7)
HOLD SPECIMEN: NORMAL
INR PPP: 0.9 (ref 0.85–1.15)
MCV RBC AUTO: 90 FL (ref 78–100)
MCV RBC AUTO: 90 FL (ref 78–100)
PLATELET # BLD AUTO: 296 10E3/UL (ref 150–450)
PROTHROMBIN TIME: 12.4 SECONDS (ref 11.8–14.8)

## 2025-08-05 PROCEDURE — 71045 X-RAY EXAM CHEST 1 VIEW: CPT

## 2025-08-05 PROCEDURE — 85049 AUTOMATED PLATELET COUNT: CPT | Performed by: STUDENT IN AN ORGANIZED HEALTH CARE EDUCATION/TRAINING PROGRAM

## 2025-08-05 PROCEDURE — 85018 HEMOGLOBIN: CPT | Performed by: STUDENT IN AN ORGANIZED HEALTH CARE EDUCATION/TRAINING PROGRAM

## 2025-08-05 PROCEDURE — 85610 PROTHROMBIN TIME: CPT | Performed by: STUDENT IN AN ORGANIZED HEALTH CARE EDUCATION/TRAINING PROGRAM

## 2025-08-05 PROCEDURE — 85730 THROMBOPLASTIN TIME PARTIAL: CPT | Performed by: STUDENT IN AN ORGANIZED HEALTH CARE EDUCATION/TRAINING PROGRAM

## 2025-08-05 PROCEDURE — 99152 MOD SED SAME PHYS/QHP 5/>YRS: CPT

## 2025-08-05 PROCEDURE — 82962 GLUCOSE BLOOD TEST: CPT

## 2025-08-05 PROCEDURE — 250N000011 HC RX IP 250 OP 636: Performed by: STUDENT IN AN ORGANIZED HEALTH CARE EDUCATION/TRAINING PROGRAM

## 2025-08-05 PROCEDURE — 88341 IMHCHEM/IMCYTCHM EA ADD ANTB: CPT | Mod: TC | Performed by: INTERNAL MEDICINE

## 2025-08-05 PROCEDURE — 36415 COLL VENOUS BLD VENIPUNCTURE: CPT | Performed by: STUDENT IN AN ORGANIZED HEALTH CARE EDUCATION/TRAINING PROGRAM

## 2025-08-05 RX ORDER — NALOXONE HYDROCHLORIDE 0.4 MG/ML
0.2 INJECTION, SOLUTION INTRAMUSCULAR; INTRAVENOUS; SUBCUTANEOUS
Status: DISCONTINUED | OUTPATIENT
Start: 2025-08-05 | End: 2025-08-06 | Stop reason: HOSPADM

## 2025-08-05 RX ORDER — NALOXONE HYDROCHLORIDE 0.4 MG/ML
0.4 INJECTION, SOLUTION INTRAMUSCULAR; INTRAVENOUS; SUBCUTANEOUS
Status: DISCONTINUED | OUTPATIENT
Start: 2025-08-05 | End: 2025-08-06 | Stop reason: HOSPADM

## 2025-08-05 RX ORDER — FLUMAZENIL 0.1 MG/ML
0.2 INJECTION, SOLUTION INTRAVENOUS
Status: DISCONTINUED | OUTPATIENT
Start: 2025-08-05 | End: 2025-08-06 | Stop reason: HOSPADM

## 2025-08-05 RX ORDER — FENTANYL CITRATE 50 UG/ML
25-50 INJECTION, SOLUTION INTRAMUSCULAR; INTRAVENOUS EVERY 5 MIN PRN
Refills: 0 | Status: DISCONTINUED | OUTPATIENT
Start: 2025-08-05 | End: 2025-08-06 | Stop reason: HOSPADM

## 2025-08-05 RX ADMIN — MIDAZOLAM HYDROCHLORIDE 1 MG: 1 INJECTION, SOLUTION INTRAMUSCULAR; INTRAVENOUS at 08:57

## 2025-08-05 RX ADMIN — MIDAZOLAM HYDROCHLORIDE 1 MG: 1 INJECTION, SOLUTION INTRAMUSCULAR; INTRAVENOUS at 08:43

## 2025-08-05 RX ADMIN — FENTANYL CITRATE 50 MCG: 50 INJECTION, SOLUTION INTRAMUSCULAR; INTRAVENOUS at 09:03

## 2025-08-05 RX ADMIN — FENTANYL CITRATE 50 MCG: 50 INJECTION, SOLUTION INTRAMUSCULAR; INTRAVENOUS at 08:47

## 2025-08-05 RX ADMIN — FENTANYL CITRATE 50 MCG: 50 INJECTION, SOLUTION INTRAMUSCULAR; INTRAVENOUS at 08:52

## 2025-08-05 RX ADMIN — MIDAZOLAM HYDROCHLORIDE 1 MG: 1 INJECTION, SOLUTION INTRAMUSCULAR; INTRAVENOUS at 08:47

## 2025-08-06 LAB
PATH REPORT.COMMENTS IMP SPEC: ABNORMAL
PATH REPORT.COMMENTS IMP SPEC: YES
PATH REPORT.FINAL DX SPEC: ABNORMAL
PATH REPORT.GROSS SPEC: ABNORMAL
PATH REPORT.MICROSCOPIC SPEC OTHER STN: ABNORMAL
PATH REPORT.RELEVANT HX SPEC: ABNORMAL
PHOTO IMAGE: ABNORMAL

## 2025-08-06 PROCEDURE — 88341 IMHCHEM/IMCYTCHM EA ADD ANTB: CPT | Mod: 26 | Performed by: PATHOLOGY

## 2025-08-06 PROCEDURE — 88342 IMHCHEM/IMCYTCHM 1ST ANTB: CPT | Mod: 26 | Performed by: PATHOLOGY

## 2025-08-06 PROCEDURE — 88360 TUMOR IMMUNOHISTOCHEM/MANUAL: CPT | Mod: 26 | Performed by: PATHOLOGY

## 2025-08-06 PROCEDURE — 88305 TISSUE EXAM BY PATHOLOGIST: CPT | Mod: 26 | Performed by: PATHOLOGY

## 2025-08-06 PROCEDURE — 88333 PATH CONSLTJ SURG CYTO XM 1: CPT | Mod: 26 | Performed by: PATHOLOGY

## 2025-08-07 DIAGNOSIS — G70.00 MYASTHENIA GRAVIS, ACETYLCHOLINE RECEPTOR ANTIBODY POSITIVE (H): Primary | ICD-10-CM

## 2025-08-14 LAB
LAB DIRECTOR RESULTS: NORMAL
LAB TEST RESULTS REPORTED IN RPTPERIOD: NORMAL
Lab: NORMAL
SPECIMEN TYPE: NORMAL
SPECIMEN TYPE: NORMAL

## (undated) RX ORDER — FENTANYL CITRATE 50 UG/ML
INJECTION, SOLUTION INTRAMUSCULAR; INTRAVENOUS
Status: DISPENSED
Start: 2025-08-05